# Patient Record
Sex: FEMALE | Race: OTHER
[De-identification: names, ages, dates, MRNs, and addresses within clinical notes are randomized per-mention and may not be internally consistent; named-entity substitution may affect disease eponyms.]

---
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
## 2019-06-15 ENCOUNTER — HOSPITAL ENCOUNTER (EMERGENCY)
Dept: HOSPITAL 61 - ER | Age: 24
Discharge: HOME | End: 2019-06-15
Payer: SELF-PAY

## 2019-06-15 VITALS — HEIGHT: 64 IN | WEIGHT: 255 LBS | BODY MASS INDEX: 43.54 KG/M2

## 2019-06-15 DIAGNOSIS — Z98.890: ICD-10-CM

## 2019-06-15 DIAGNOSIS — H60.312: Primary | ICD-10-CM

## 2019-06-15 PROCEDURE — 99283 EMERGENCY DEPT VISIT LOW MDM: CPT

## 2019-06-15 NOTE — PHYS DOC
Past Medical History


Past Medical History:  No Pertinent History


Past Surgical History:  


Additional Past Surgical Histo:  R WRIST


Alcohol Use:  None


Drug Use:  None





Adult General


Chief Complaint


Chief Complaint:  EARACHE/EAR PAIN





[facility]


HPI





Patient is a 24  year old [female who presents with [left ear pain for 3 days. 

States she has no idea what caused it, denies putting any objects in ear, states

 she has never had this problem in the past. Denies fever. Does state she has 

been taking some Tylenol which has helped her discomfort some. Denies cough, 

denies sore throat, denies exposures to other ill contacts.]





Review of Systems


Review of Systems





Constitutional: Denies fever or chills []


Eyes: Denies change in visual acuity, redness, or eye pain []


HENT: Denies nasal congestion or sore throat Complains of pain to left ear []


Respiratory: Denies cough or shortness of breath []


Cardiovascular: No additional information not addressed in HPI []


GI: Denies abdominal pain, nausea, vomiting, bloody stools or diarrhea []


: Denies dysuria or hematuria []








All other systems were reviewed and found to be within normal limits, except as 

documented in this note.





Allergies


Allergies





Allergies








Coded Allergies Type Severity Reaction Last Updated Verified


 


  No Known Drug Allergies    14 No











Physical Exam


Physical Exam





Constitutional: Well developed, well nourished, no acute distress, non-toxic 

appearance. []


HENT: Normocephalic, atraumatic, oropharynx moist, no oral exudates, nose 

normal. Left ear with tragal tenderness, discomfort on palpation or movement of 

pinna. Canal noted inflammed, bilaterally. No foreign bodies noted, no purulence

 noted. []


Eyes: PERRLA, EOMI, conjunctiva normal, no discharge. [] 


Neck: Normal range of motion, no tenderness, supple, no stridor. [] 


Cardiovascular:Heart rate regular rhythm, no murmur []


Lungs & Thorax:  Bilateral breath sounds clear to auscultation []


Abdomen: Bowel sounds normal, soft, no tenderness, no masses, no pulsatile 

masses. [] 


Skin: Warm, dry, no erythema, no rash. [] 


Back: No tenderness, no CVA tenderness. [] 


Extremities: No tenderness, no cyanosis, no clubbing, ROM intact, no edema. [] 


Neurologic: Alert and oriented X 3, normal motor function, normal sensory 

function, no focal deficits noted. []


Psychologic: Affect normal, judgement normal, mood normal. []





Current Patient Data


Vital Signs





                                   Vital Signs








  Date Time  Temp Pulse Resp B/P (MAP) Pulse Ox O2 Delivery O2 Flow Rate FiO2


 


6/15/19 12:45 98.1 84 22 143/84 (103) 97 Room Air  





 98.1       











EKG


EKG


[]





Radiology/Procedures


Radiology/Procedures


[]





Course & Med Decision Making


Course & Med Decision Making


Pertinent Labs and Imaging studies reviewed. (See chart for details)





[Discussed findings consistent with Otitis Externa, with tragal, pinna 

tenderness and swollen canal. Discussed use of ear drops and use of tylenol for 

discomfort. ]





Dragon Disclaimer


Dragon Disclaimer


This electronic medical record was generated, in whole or in part, using a voice

recognition dictation system.





Departure


Departure


Impression:  


   Primary Impression:  


   Otitis externa


Disposition:   HOME, SELF-CARE


Condition:  GOOD


Referrals:  


NO PCP (PCP)


Patient Instructions:  Otitis Externa, Easy-to-Read





Additional Instructions:  


You should continue to take Tylenol for pain.


You can take 500 or 1000 mg every 6 hours for the pain


Do not put anything in your ear, other than the ear drops





Use the ear drops for 7 days, even if you feel better after 2 or 3 days. 


Use drops in both of your ears


Scripts


Ciprofloxacin/Hydrocortisone (CIPRO HC OTIC SUSPENSION) 10 Ml Drops.susp


3 DROP EACH EAR BID, #10 ML


   Prov: YENI DANIELSON         6/15/19





Problem Qualifiers








   Primary Impression:  


   Otitis externa


   Otitis externa type:  diffuse  Chronicity:  acute  Laterality:  left  

   Qualified Codes:  H60.312 - Diffuse otitis externa, left ear








YENI DANIELSON APRN              Stephen 15, 2019 13:20